# Patient Record
Sex: FEMALE | Race: ASIAN | NOT HISPANIC OR LATINO | Employment: PART TIME | URBAN - METROPOLITAN AREA
[De-identification: names, ages, dates, MRNs, and addresses within clinical notes are randomized per-mention and may not be internally consistent; named-entity substitution may affect disease eponyms.]

---

## 2017-01-02 ENCOUNTER — HOME CARE/HOSPICE - HEALTHEAST (OUTPATIENT)
Dept: HOME HEALTH SERVICES | Facility: HOME HEALTH | Age: 27
End: 2017-01-02

## 2017-01-03 ENCOUNTER — HOME CARE/HOSPICE - HEALTHEAST (OUTPATIENT)
Dept: HOME HEALTH SERVICES | Facility: HOME HEALTH | Age: 27
End: 2017-01-03

## 2017-01-04 ENCOUNTER — HOME CARE/HOSPICE - HEALTHEAST (OUTPATIENT)
Dept: HOME HEALTH SERVICES | Facility: HOME HEALTH | Age: 27
End: 2017-01-04

## 2017-05-24 ENCOUNTER — COMMUNICATION - HEALTHEAST (OUTPATIENT)
Dept: HEALTH INFORMATION MANAGEMENT | Facility: CLINIC | Age: 27
End: 2017-05-24

## 2017-05-24 ENCOUNTER — COMMUNICATION - HEALTHEAST (OUTPATIENT)
Dept: TELEHEALTH | Facility: CLINIC | Age: 27
End: 2017-05-24

## 2018-02-20 ENCOUNTER — COMMUNICATION - HEALTHEAST (OUTPATIENT)
Dept: FAMILY MEDICINE | Facility: CLINIC | Age: 28
End: 2018-02-20

## 2018-11-01 ENCOUNTER — RECORDS - HEALTHEAST (OUTPATIENT)
Dept: LAB | Facility: CLINIC | Age: 28
End: 2018-11-01

## 2018-11-02 LAB
HBV SURFACE AB SERPL IA-ACNC: POSITIVE M[IU]/ML
MEV IGG SER IA-ACNC: NEGATIVE
MUV IGG SER QL IA: POSITIVE
RUBV IGG SERPL QL IA: POSITIVE

## 2018-11-05 LAB
GAMMA INTERFERON BACKGROUND BLD IA-ACNC: 0.1 IU/ML
M TB IFN-G BLD-IMP: NEGATIVE
MITOGEN IGNF BCKGRD COR BLD-ACNC: -0.04 IU/ML
MITOGEN IGNF BCKGRD COR BLD-ACNC: -0.06 IU/ML
QTF INTERPRETATION: NORMAL
QTF MITOGEN - NIL: >10 IU/ML

## 2019-04-09 ENCOUNTER — RECORDS - HEALTHEAST (OUTPATIENT)
Dept: LAB | Facility: CLINIC | Age: 29
End: 2019-04-09

## 2019-04-10 LAB
MEV IGG SER IA-ACNC: NEGATIVE
MUV IGG SER QL IA: NEGATIVE
RUBV IGG SERPL QL IA: POSITIVE

## 2019-07-04 ENCOUNTER — HOSPITAL ENCOUNTER (OUTPATIENT)
Dept: MEDSURG UNIT | Facility: CLINIC | Age: 29
Discharge: HOME | End: 2019-07-04
Attending: OBSTETRICS & GYNECOLOGY | Admitting: OBSTETRICS & GYNECOLOGY

## 2019-08-18 ENCOUNTER — HOME CARE/HOSPICE - HEALTHEAST (OUTPATIENT)
Dept: HOME HEALTH SERVICES | Facility: HOME HEALTH | Age: 29
End: 2019-08-18

## 2021-06-27 ENCOUNTER — HEALTH MAINTENANCE LETTER (OUTPATIENT)
Age: 31
End: 2021-06-27

## 2021-10-17 ENCOUNTER — HEALTH MAINTENANCE LETTER (OUTPATIENT)
Age: 31
End: 2021-10-17

## 2021-10-19 ENCOUNTER — OFFICE VISIT (OUTPATIENT)
Dept: FAMILY MEDICINE | Facility: CLINIC | Age: 31
End: 2021-10-19
Payer: COMMERCIAL

## 2021-10-19 VITALS
TEMPERATURE: 98.2 F | WEIGHT: 161.9 LBS | RESPIRATION RATE: 16 BRPM | HEIGHT: 62 IN | DIASTOLIC BLOOD PRESSURE: 66 MMHG | BODY MASS INDEX: 29.79 KG/M2 | HEART RATE: 67 BPM | OXYGEN SATURATION: 98 % | SYSTOLIC BLOOD PRESSURE: 118 MMHG

## 2021-10-19 DIAGNOSIS — R07.0 THROAT PAIN: Primary | ICD-10-CM

## 2021-10-19 LAB
DEPRECATED S PYO AG THROAT QL EIA: NEGATIVE
GROUP A STREP BY PCR: NOT DETECTED
SARS-COV-2 RNA RESP QL NAA+PROBE: NEGATIVE

## 2021-10-19 PROCEDURE — U0005 INFEC AGEN DETEC AMPLI PROBE: HCPCS | Performed by: PHYSICIAN ASSISTANT

## 2021-10-19 PROCEDURE — 99203 OFFICE O/P NEW LOW 30 MIN: CPT | Performed by: PHYSICIAN ASSISTANT

## 2021-10-19 PROCEDURE — U0003 INFECTIOUS AGENT DETECTION BY NUCLEIC ACID (DNA OR RNA); SEVERE ACUTE RESPIRATORY SYNDROME CORONAVIRUS 2 (SARS-COV-2) (CORONAVIRUS DISEASE [COVID-19]), AMPLIFIED PROBE TECHNIQUE, MAKING USE OF HIGH THROUGHPUT TECHNOLOGIES AS DESCRIBED BY CMS-2020-01-R: HCPCS | Performed by: PHYSICIAN ASSISTANT

## 2021-10-19 PROCEDURE — 87651 STREP A DNA AMP PROBE: CPT | Performed by: PHYSICIAN ASSISTANT

## 2021-10-19 RX ORDER — ACETAMINOPHEN AND CODEINE PHOSPHATE 120; 12 MG/5ML; MG/5ML
SOLUTION ORAL
Status: ON HOLD | COMMUNITY
Start: 2021-09-19 | End: 2022-08-30

## 2021-10-19 ASSESSMENT — MIFFLIN-ST. JEOR: SCORE: 1407.62

## 2021-10-19 NOTE — PROGRESS NOTES
"  Assessment & Plan:      Problem List Items Addressed This Visit     None      Visit Diagnoses     Throat pain    -  Primary    Relevant Orders    Streptococcus A Rapid Screen w/Reflex to PCR - Clinic Collect (Completed)    Group A Streptococcus PCR Throat Swab    Symptomatic COVID-19 Virus (Coronavirus) by PCR        Medical Decision Making  Patient presents with acute onset throat pain.  Rapid strep is negative at this time.  Suspect likely viral etiology.  There is in process COVID-19.  Discussed self-isolation and prevention of spreading illness.  Continue with honey, salt water gargles, and over-the-counter analgesics as needed.  Discussed signs of worsening symptoms and when to follow-up with PCP if no symptom improvement.     Subjective:      Shavonne Ramos is a 31 year old female here for evaluation of sore throat.  Onset of symptoms was 3 to 4 days ago.  Associated symptoms include swollen lymph nodes along the anterior cervical region.  Patient has previously been vaccinated for COVID-19.  She denies rhinorrhea, cough, and shortness of breath.     The following portions of the patient's history were reviewed and updated as appropriate: allergies, current medications, and problem list.     Review of Systems  Pertinent items are noted in HPI.    Allergies  No Known Allergies    No family history on file.    Social History     Tobacco Use     Smoking status: Never Smoker     Smokeless tobacco: Never Used   Substance Use Topics     Alcohol use: No        Objective:      /66   Pulse 67   Temp 98.2  F (36.8  C)   Resp 16   Ht 1.575 m (5' 2\")   Wt 73.4 kg (161 lb 14.4 oz)   SpO2 98%   BMI 29.61 kg/m    General appearance - alert, well appearing, and in no distress and non-toxic  Ears - bilateral TM's and external ear canals normal  Nose - normal and patent, no erythema, discharge or polyps  Mouth - Posterior oropharynx mildly erythematous, no tonsillar swelling or exudate, mucous membranes moist, lips " and tongue normal  Neck - Mild to moderate bilateral anterior cervical lymphadenopathy  Chest - clear to auscultation, no wheezes, rales or rhonchi, symmetric air entry  Heart - normal rate, regular rhythm, normal S1, S2, no murmurs, rubs, clicks or gallops     Lab & Imaging Results    Results for orders placed or performed in visit on 10/19/21 (from the past 24 hour(s))   Streptococcus A Rapid Screen w/Reflex to PCR - Clinic Collect    Specimen: Throat; Swab   Result Value Ref Range    Group A Strep antigen Negative Negative       I personally reviewed these results and discussed findings with the patient.    The use of Dragon/2nd Watch dictation services was used to construct the content of this note; any grammatical errors are non-intentional. Please contact the author directly if you are in need of any clarification.

## 2021-10-19 NOTE — PATIENT INSTRUCTIONS
"Rapid Strep test was negative.     If overnight test returns positive, we will call tomorrow and call in antibiotic prescription.    No notification means that overnight test returned negative.    Symptoms are likely due to viral infection that will resolve on its own in 1-2 weeks.    May continue with symptomatic treatments including:  - Salt water gargles  - Warm beverages such as a non-caffeinated tea with honey  - Throat drops  - Ibuprofen or acetaminophen for pain or fever relief    If fevers not coming down with acetaminophen or ibuprofen, shortness of breath, not tolerating oral liquid intake, drooling, or stiff neck, return for re-evaluation immediately. Otherwise, if no improvement in the next week, follow up with your primary care provider.    Discharge Instructions for COVID-19 Patients  You have--or may have--COVID-19. Please follow the instructions listed below.   If you have a weakened immune system, discuss with your doctor any other actions you need to take.  How can I protect others?  If you have symptoms (fever, cough, body aches or trouble breathing):    Stay home and away from others (self-isolate) until:  ? Your other symptoms have resolved (gotten better). And   ? You've had no fever--and no medicine that reduces fever--for 1 full day (24 hours). And   ? At least 10 days have passed since your symptoms started. (You may need to wait 20 days. Follow the advice of your care team.)  If you don't show symptoms, but testing showed that you have COVID-19:    Stay home and away from others (self-isolate) until at least 10 days have passed since the date of your first positive COVID-19 test.  During this time    Stay in your own room, even for meals. Use your own bathroom if you can.    Stay away from others in your home. No hugging, kissing or shaking hands. No visitors.    Don't go to work, school or anywhere else.    Clean \"high touch\" surfaces often (doorknobs, counters, handles). Use household " cleaning spray or wipes.    You'll find a full list of  on the EPA website: www.epa.gov/pesticide-registration/list-n-disinfectants-use-against-sars-cov-2.    Cover your mouth and nose with a mask or other face covering to avoid spreading germs.    Wash your hands and face often. Use soap and water.    Caregivers in these groups are at risk for severe illness due to COVID-19:  ? People 65 years and older  ? People who live in a nursing home or long-term care facility  ? People with chronic disease (lung, heart, cancer, diabetes, kidney, liver, immunologic)  ? People who have a weakened immune system, including those who:    Are in cancer treatment    Take medicine that weakens the immune system, such as corticosteroids    Had a bone marrow or organ transplant    Have an immune deficiency    Have poorly controlled HIV or AIDS    Are obese (body mass index of 40 or higher)    Smoke regularly    Caregivers should wear gloves while washing dishes, handling laundry and cleaning bedrooms and bathrooms.    Use caution when washing and drying laundry: Don't shake dirty laundry and use the warmest water setting that you can.    For more tips on managing your health at home, go to www.cdc.gov/coronavirus/2019-ncov/downloads/10Things.pdf.  How can I take care of myself at home?  1. Get lots of rest. Drink extra fluids (unless a doctor has told you not to).  2. Take Tylenol (acetaminophen) for fever or pain. If you have liver or kidney problems, ask your family doctor if it's okay to take Tylenol.   Adults can take either:   ? 650 mg (two 325 mg pills) every 4 to 6 hours, or   ? 1,000 mg (two 500 mg pills) every 8 hours as needed.  ? Note: Don't take more than 3,000 mg in one day. Acetaminophen is found in many medicines (both prescribed and over-the-counter medicines). Read all labels to be sure you don't take too much.   For children, check the Tylenol bottle for the right dose. The dose is based on the child's age or  weight.  3. If you have other health problems (like cancer, heart failure, an organ transplant or severe kidney disease): Call your specialty clinic if you don't feel better in the next 2 days.  4. Know when to call 911. Emergency warning signs include:  ? Trouble breathing or shortness of breath  ? Pain or pressure in the chest that doesn't go away  ? Feeling confused like you haven't felt before, or not being able to wake up  ? Bluish-colored lips or face  5. Your doctor may have prescribed a blood thinner medicine. Follow their instructions.  Where can I get more information?    [facility] - About COVID-19:   [email or web address]    CDC - What to Do If You're Sick: www.cdc.gov/coronavirus/2019-ncov/about/steps-when-sick.html    CDC - Ending Home Isolation: www.cdc.gov/coronavirus/2019-ncov/hcp/disposition-in-home-patients.html    CDC - Caring for Someone: www.cdc.gov/coronavirus/2019-ncov/if-you-are-sick/care-for-someone.html    [facility] - Interim Guidance for Hospital Discharge to Home: www.health.[facility].[place]/diseases/coronavirus/hcp/hospdischarge.pdf    Below are the COVID-19 hotlines at the [place] Department of Health ([facility]). Interpreters are available.  ? For health questions: Call 758-773-5846 or 1-556.839.1228 (7 a.m. to 7 p.m.)  ? For questions about schools and childcare: Call 543-311-6810 or 1-930.946.4003 (7 a.m. to 7 p.m.)    For informational purposes only. Not to replace the advice of your health care provider. Clinically reviewed by Dr. Antonio Zuñiga.   Copyright   2020 [employer]. All rights reserved. Tyba [id number] - REV 01/05/21.

## 2022-03-02 LAB — HEPATITIS B SURFACE ANTIGEN (EXTERNAL): NEGATIVE

## 2022-03-03 ENCOUNTER — TRANSFERRED RECORDS (OUTPATIENT)
Dept: HEALTH INFORMATION MANAGEMENT | Facility: CLINIC | Age: 32
End: 2022-03-03

## 2022-07-24 ENCOUNTER — HEALTH MAINTENANCE LETTER (OUTPATIENT)
Age: 32
End: 2022-07-24

## 2022-08-17 ENCOUNTER — LAB REQUISITION (OUTPATIENT)
Dept: LAB | Facility: CLINIC | Age: 32
End: 2022-08-17
Payer: COMMERCIAL

## 2022-08-17 DIAGNOSIS — Z36.85 ENCOUNTER FOR ANTENATAL SCREENING FOR STREPTOCOCCUS B: ICD-10-CM

## 2022-08-17 PROCEDURE — 87653 STREP B DNA AMP PROBE: CPT | Mod: ORL | Performed by: NURSE PRACTITIONER

## 2022-08-18 LAB — GP B STREP DNA SPEC QL NAA+PROBE: NEGATIVE

## 2022-08-30 ENCOUNTER — HOSPITAL ENCOUNTER (OUTPATIENT)
Facility: HOSPITAL | Age: 32
Discharge: HOME | End: 2022-08-30
Attending: OBSTETRICS & GYNECOLOGY | Admitting: OBSTETRICS & GYNECOLOGY
Payer: COMMERCIAL

## 2022-08-30 VITALS
SYSTOLIC BLOOD PRESSURE: 112 MMHG | BODY MASS INDEX: 29.44 KG/M2 | RESPIRATION RATE: 18 BRPM | HEART RATE: 90 BPM | TEMPERATURE: 98.2 F | WEIGHT: 160 LBS | DIASTOLIC BLOOD PRESSURE: 77 MMHG | HEIGHT: 62 IN

## 2022-08-30 PROBLEM — Z36.89 ENCOUNTER FOR TRIAGE IN PREGNANT PATIENT: Status: ACTIVE | Noted: 2022-08-30

## 2022-08-30 PROCEDURE — G0463 HOSPITAL OUTPT CLINIC VISIT: HCPCS

## 2022-08-30 ASSESSMENT — ACTIVITIES OF DAILY LIVING (ADL): ADLS_ACUITY_SCORE: 35

## 2022-08-30 NOTE — PROGRESS NOTES
Shavonne arrived ambulatory from home with complaints of contractions that started this morning at 1000. Pt states positive fetal movement and denies any bleeding or leaking of fluids. Abdomen soft, non-tender. Pt states that since arriving to the hospital, her contractions have spaced out. Monitors placed, call light within reach.

## 2022-08-30 NOTE — PROGRESS NOTES
Dr Townsend called and notified of unchanged SVE. Pt still feels occasional contractions but they have not increased in intensity of frequency. Pt has follow up appointment with Dr Townsend tomorrow, 8/31/22. Pt given discharge paperwork and instructed to follow up with PCP if any bleeding or leaking of fluids, decreased fetal movement, or painful, regular contractions. Pt verbalizes understanding.

## 2022-08-30 NOTE — PROGRESS NOTES
Dr Sanchez, covering OB, called and notified of pt arrival along with FHT tracing and SVE. .Order to observe patient for two hours and see if she makes cervical change. Dr Townsend called unit and aware of plan as well.

## 2022-08-30 NOTE — DISCHARGE INSTRUCTIONS
Discharge Instruction for Undelivered Patients      Activity:  Bedrest     Call your provider if you notice:  Swelling in your face or increased swelling in your hands or legs.  Headaches that are not relieved by Tylenol (acetaminophen).  Changes in your vision (blurring: seeing spots or stars.)  Nausea (sick to your stomach) and vomiting (throwing up).   Weight gain of 5 pounds or more per week.  Heartburn that doesn't go away.  Signs of bladder infection: pain when you urinate (use the toilet), need to go more often and more urgently.  The bag of irwin (rupture of membranes) breaks, or you notice leaking in your underwear.  Bright red blood in your underwear.  Abdominal (lower belly) or stomach pain.  For first baby: Contractions (tightening) less than 5 minutes apart for one hour or more.  Second (plus) baby: Contractions (tightening) less than 10 minutes apart and getting stronger.  *If less than 34 weeks: Contractions (tightening) more than 6 times in one hour.      Follow-up:  Scheduled appointment 8/31/22        Kick Counts  It s normal to worry about your baby s health. One way you can know your baby s doing well is to record the baby s movements once a day. This is called a kick count.   Remember to take your kick count records to all your appointments with your healthcare provider.  How to count kicks    Time how long it takes you to feel 10 kicks, flutters, swishes, or rolls. Ideally, you want to feel at least 10 movements in 2 hours. You will likely feel 10 movements in less time than that.  Starting at 28 weeks, count your baby's movements daily. Follow your healthcare provider's instructions for kick counting. Here are tips for counting kicks:  Choose a time when the baby is active, such as after a meal.   Sit comfortably or lie on your side.   The first time the baby moves, write down the time.   Count each movement until the baby has moved  10 times. This can take from 20 minutes to 2 hours.   If  you haven't felt 10 kicks by the end of the second hour, wait a few hours. Then try again.  Try to do it at the same time each day.  When to call your healthcare provider  Call your healthcare provider  right away if:  You do a couple sets of kick counts during the day and your baby moves fewer than 10 times in 2 hours.  Your baby moves much less often than on the days before.  You haven't felt your baby move all day.  [employer] last reviewed this educational content on 8/1/2020 2000-2021 [employer]. All rights reserved. This information is not intended as a substitute for professional medical care. Always follow your healthcare professional's instructions.

## 2022-08-31 ENCOUNTER — HOSPITAL ENCOUNTER (OUTPATIENT)
Facility: HOSPITAL | Age: 32
Discharge: HOME | End: 2022-08-31
Attending: OBSTETRICS & GYNECOLOGY | Admitting: OBSTETRICS & GYNECOLOGY
Payer: COMMERCIAL

## 2022-08-31 VITALS
TEMPERATURE: 97.9 F | WEIGHT: 176 LBS | DIASTOLIC BLOOD PRESSURE: 79 MMHG | HEIGHT: 62 IN | RESPIRATION RATE: 16 BRPM | BODY MASS INDEX: 32.39 KG/M2 | SYSTOLIC BLOOD PRESSURE: 114 MMHG

## 2022-08-31 PROCEDURE — G0463 HOSPITAL OUTPT CLINIC VISIT: HCPCS

## 2022-08-31 ASSESSMENT — ACTIVITIES OF DAILY LIVING (ADL): ADLS_ACUITY_SCORE: 35

## 2022-09-01 NOTE — DISCHARGE INSTRUCTIONS
Discharge Instruction for Undelivered Patients      You were seen for: Labor Assessment  We Consulted: Dr. Townsend  You had (Test or Medicine): fetal monitoring     Diet:   Drink 8 to 12 glasses of liquids (milk, juice, water) every day.     Activity:  Call your doctor or nurse midwife if your baby is moving less than usual.     Call your provider if you notice:  Swelling in your face or increased swelling in your hands or legs.  Headaches that are not relieved by Tylenol (acetaminophen).  Changes in your vision (blurring: seeing spots or stars.)  Nausea (sick to your stomach) and vomiting (throwing up).   Weight gain of 5 pounds or more per week.  Heartburn that doesn't go away.  Signs of bladder infection: pain when you urinate (use the toilet), need to go more often and more urgently.  The bag of irwin (rupture of membranes) breaks, or you notice leaking in your underwear.  Bright red blood in your underwear.  Abdominal (lower belly) or stomach pain.  For first baby: Contractions (tightening) less than 5 minutes apart for one hour or more.  Second (plus) baby: Contractions (tightening) less than 10 minutes apart and getting stronger.  *If less than 34 weeks: Contractions (tightening) more than 6 times in one hour.  Increase or change in vaginal discharge (note the color and amount)    Follow-up:  As scheduled in the clinic

## 2022-09-01 NOTE — PROGRESS NOTES
Dr. Townsend notified of patient's SVE, unchanged. Orders to discharge home and call hospital if any signs of labor start.    Reviewed discharge instructions, danger signs, and follow up with patient. All questions and concerns answered at this time. Patient verbalized understanding. Patient discharged home.

## 2022-09-08 ENCOUNTER — HOSPITAL ENCOUNTER (INPATIENT)
Facility: HOSPITAL | Age: 32
LOS: 2 days | Discharge: HOME | End: 2022-09-10
Attending: OBSTETRICS & GYNECOLOGY | Admitting: OBSTETRICS & GYNECOLOGY
Payer: COMMERCIAL

## 2022-09-08 LAB
ABO/RH(D): NORMAL
ANTIBODY SCREEN: NEGATIVE
SPECIMEN EXPIRATION DATE: NORMAL

## 2022-09-08 PROCEDURE — 36415 COLL VENOUS BLD VENIPUNCTURE: CPT | Performed by: OBSTETRICS & GYNECOLOGY

## 2022-09-08 PROCEDURE — U0005 INFEC AGEN DETEC AMPLI PROBE: HCPCS | Performed by: OBSTETRICS & GYNECOLOGY

## 2022-09-08 PROCEDURE — 250N000011 HC RX IP 250 OP 636: Performed by: OBSTETRICS & GYNECOLOGY

## 2022-09-08 PROCEDURE — 85027 COMPLETE CBC AUTOMATED: CPT | Performed by: OBSTETRICS & GYNECOLOGY

## 2022-09-08 PROCEDURE — 86850 RBC ANTIBODY SCREEN: CPT | Performed by: OBSTETRICS & GYNECOLOGY

## 2022-09-08 PROCEDURE — 120N000001 HC R&B MED SURG/OB

## 2022-09-08 PROCEDURE — 86780 TREPONEMA PALLIDUM: CPT | Performed by: OBSTETRICS & GYNECOLOGY

## 2022-09-08 RX ORDER — SODIUM CHLORIDE, SODIUM LACTATE, POTASSIUM CHLORIDE, CALCIUM CHLORIDE 600; 310; 30; 20 MG/100ML; MG/100ML; MG/100ML; MG/100ML
INJECTION, SOLUTION INTRAVENOUS CONTINUOUS
Status: DISCONTINUED | OUTPATIENT
Start: 2022-09-08 | End: 2022-09-09 | Stop reason: HOSPADM

## 2022-09-08 RX ORDER — METOCLOPRAMIDE 10 MG/1
10 TABLET ORAL EVERY 6 HOURS PRN
Status: DISCONTINUED | OUTPATIENT
Start: 2022-09-08 | End: 2022-09-09 | Stop reason: HOSPADM

## 2022-09-08 RX ORDER — NALOXONE HYDROCHLORIDE 0.4 MG/ML
0.4 INJECTION, SOLUTION INTRAMUSCULAR; INTRAVENOUS; SUBCUTANEOUS
Status: DISCONTINUED | OUTPATIENT
Start: 2022-09-08 | End: 2022-09-09 | Stop reason: HOSPADM

## 2022-09-08 RX ORDER — PROCHLORPERAZINE 25 MG
25 SUPPOSITORY, RECTAL RECTAL EVERY 12 HOURS PRN
Status: DISCONTINUED | OUTPATIENT
Start: 2022-09-08 | End: 2022-09-09 | Stop reason: HOSPADM

## 2022-09-08 RX ORDER — NALOXONE HYDROCHLORIDE 0.4 MG/ML
0.2 INJECTION, SOLUTION INTRAMUSCULAR; INTRAVENOUS; SUBCUTANEOUS
Status: DISCONTINUED | OUTPATIENT
Start: 2022-09-08 | End: 2022-09-09 | Stop reason: HOSPADM

## 2022-09-08 RX ORDER — PROCHLORPERAZINE MALEATE 10 MG
10 TABLET ORAL EVERY 6 HOURS PRN
Status: DISCONTINUED | OUTPATIENT
Start: 2022-09-08 | End: 2022-09-09 | Stop reason: HOSPADM

## 2022-09-08 RX ORDER — OXYTOCIN 10 [USP'U]/ML
10 INJECTION, SOLUTION INTRAMUSCULAR; INTRAVENOUS
Status: DISCONTINUED | OUTPATIENT
Start: 2022-09-08 | End: 2022-09-10 | Stop reason: HOSPADM

## 2022-09-08 RX ORDER — LIDOCAINE 40 MG/G
CREAM TOPICAL
Status: DISCONTINUED | OUTPATIENT
Start: 2022-09-08 | End: 2022-09-09 | Stop reason: HOSPADM

## 2022-09-08 RX ORDER — ACETAMINOPHEN 325 MG/1
650 TABLET ORAL EVERY 4 HOURS PRN
Status: DISCONTINUED | OUTPATIENT
Start: 2022-09-08 | End: 2022-09-09 | Stop reason: HOSPADM

## 2022-09-08 RX ORDER — OXYTOCIN/0.9 % SODIUM CHLORIDE 30/500 ML
340 PLASTIC BAG, INJECTION (ML) INTRAVENOUS CONTINUOUS PRN
Status: DISCONTINUED | OUTPATIENT
Start: 2022-09-08 | End: 2022-09-09 | Stop reason: HOSPADM

## 2022-09-08 RX ORDER — OXYTOCIN 10 [USP'U]/ML
10 INJECTION, SOLUTION INTRAMUSCULAR; INTRAVENOUS
Status: DISCONTINUED | OUTPATIENT
Start: 2022-09-08 | End: 2022-09-09 | Stop reason: HOSPADM

## 2022-09-08 RX ORDER — CITRIC ACID/SODIUM CITRATE 334-500MG
30 SOLUTION, ORAL ORAL
Status: DISCONTINUED | OUTPATIENT
Start: 2022-09-08 | End: 2022-09-09 | Stop reason: HOSPADM

## 2022-09-08 RX ORDER — ONDANSETRON 4 MG/1
4 TABLET, ORALLY DISINTEGRATING ORAL EVERY 6 HOURS PRN
Status: DISCONTINUED | OUTPATIENT
Start: 2022-09-08 | End: 2022-09-09 | Stop reason: HOSPADM

## 2022-09-08 RX ORDER — METHYLERGONOVINE MALEATE 0.2 MG/ML
200 INJECTION INTRAVENOUS
Status: DISCONTINUED | OUTPATIENT
Start: 2022-09-08 | End: 2022-09-09 | Stop reason: HOSPADM

## 2022-09-08 RX ORDER — ONDANSETRON 2 MG/ML
4 INJECTION INTRAMUSCULAR; INTRAVENOUS EVERY 6 HOURS PRN
Status: DISCONTINUED | OUTPATIENT
Start: 2022-09-08 | End: 2022-09-09 | Stop reason: HOSPADM

## 2022-09-08 RX ORDER — OXYTOCIN/0.9 % SODIUM CHLORIDE 30/500 ML
100-340 PLASTIC BAG, INJECTION (ML) INTRAVENOUS CONTINUOUS PRN
Status: DISCONTINUED | OUTPATIENT
Start: 2022-09-08 | End: 2022-09-10 | Stop reason: HOSPADM

## 2022-09-08 RX ORDER — MISOPROSTOL 200 UG/1
800 TABLET ORAL
Status: DISCONTINUED | OUTPATIENT
Start: 2022-09-08 | End: 2022-09-09 | Stop reason: HOSPADM

## 2022-09-08 RX ORDER — IBUPROFEN 800 MG/1
800 TABLET, FILM COATED ORAL
Status: DISCONTINUED | OUTPATIENT
Start: 2022-09-08 | End: 2022-09-10 | Stop reason: HOSPADM

## 2022-09-08 RX ORDER — FENTANYL CITRATE 50 UG/ML
100 INJECTION, SOLUTION INTRAMUSCULAR; INTRAVENOUS
Status: DISCONTINUED | OUTPATIENT
Start: 2022-09-08 | End: 2022-09-09 | Stop reason: HOSPADM

## 2022-09-08 RX ORDER — MISOPROSTOL 200 UG/1
400 TABLET ORAL
Status: DISCONTINUED | OUTPATIENT
Start: 2022-09-08 | End: 2022-09-09 | Stop reason: HOSPADM

## 2022-09-08 RX ORDER — METOCLOPRAMIDE HYDROCHLORIDE 5 MG/ML
10 INJECTION INTRAMUSCULAR; INTRAVENOUS EVERY 6 HOURS PRN
Status: DISCONTINUED | OUTPATIENT
Start: 2022-09-08 | End: 2022-09-09 | Stop reason: HOSPADM

## 2022-09-08 RX ORDER — KETOROLAC TROMETHAMINE 30 MG/ML
30 INJECTION, SOLUTION INTRAMUSCULAR; INTRAVENOUS
Status: DISCONTINUED | OUTPATIENT
Start: 2022-09-08 | End: 2022-09-10 | Stop reason: HOSPADM

## 2022-09-08 RX ORDER — CARBOPROST TROMETHAMINE 250 UG/ML
250 INJECTION, SOLUTION INTRAMUSCULAR
Status: DISCONTINUED | OUTPATIENT
Start: 2022-09-08 | End: 2022-09-09 | Stop reason: HOSPADM

## 2022-09-08 RX ADMIN — FENTANYL CITRATE 100 MCG: 50 INJECTION, SOLUTION INTRAMUSCULAR; INTRAVENOUS at 23:35

## 2022-09-08 ASSESSMENT — ACTIVITIES OF DAILY LIVING (ADL): ADLS_ACUITY_SCORE: 35

## 2022-09-09 LAB
ERYTHROCYTE [DISTWIDTH] IN BLOOD BY AUTOMATED COUNT: 15.1 % (ref 10–15)
HCT VFR BLD AUTO: 33.6 % (ref 35–47)
HGB BLD-MCNC: 11 G/DL (ref 11.7–15.7)
HOLD SPECIMEN: NORMAL
MCH RBC QN AUTO: 24.2 PG (ref 26.5–33)
MCHC RBC AUTO-ENTMCNC: 32.7 G/DL (ref 31.5–36.5)
MCV RBC AUTO: 74 FL (ref 78–100)
PLATELET # BLD AUTO: 220 10E3/UL (ref 150–450)
RBC # BLD AUTO: 4.54 10E6/UL (ref 3.8–5.2)
SARS-COV-2 RNA RESP QL NAA+PROBE: NEGATIVE
T PALLIDUM AB SER QL: NONREACTIVE
WBC # BLD AUTO: 9.7 10E3/UL (ref 4–11)

## 2022-09-09 PROCEDURE — 722N000001 HC LABOR CARE VAGINAL DELIVERY SINGLE

## 2022-09-09 PROCEDURE — 250N000009 HC RX 250: Performed by: OBSTETRICS & GYNECOLOGY

## 2022-09-09 PROCEDURE — 250N000011 HC RX IP 250 OP 636: Performed by: OBSTETRICS & GYNECOLOGY

## 2022-09-09 PROCEDURE — 250N000013 HC RX MED GY IP 250 OP 250 PS 637: Performed by: OBSTETRICS & GYNECOLOGY

## 2022-09-09 PROCEDURE — 120N000001 HC R&B MED SURG/OB

## 2022-09-09 PROCEDURE — 0KQM0ZZ REPAIR PERINEUM MUSCLE, OPEN APPROACH: ICD-10-PCS | Performed by: OBSTETRICS & GYNECOLOGY

## 2022-09-09 RX ORDER — ACETAMINOPHEN 325 MG/1
650 TABLET ORAL EVERY 4 HOURS PRN
Status: DISCONTINUED | OUTPATIENT
Start: 2022-09-09 | End: 2022-09-10 | Stop reason: HOSPADM

## 2022-09-09 RX ORDER — MODIFIED LANOLIN
OINTMENT (GRAM) TOPICAL
Status: DISCONTINUED | OUTPATIENT
Start: 2022-09-09 | End: 2022-09-10 | Stop reason: HOSPADM

## 2022-09-09 RX ORDER — CARBOPROST TROMETHAMINE 250 UG/ML
250 INJECTION, SOLUTION INTRAMUSCULAR
Status: DISCONTINUED | OUTPATIENT
Start: 2022-09-09 | End: 2022-09-10 | Stop reason: HOSPADM

## 2022-09-09 RX ORDER — METHYLERGONOVINE MALEATE 0.2 MG/ML
200 INJECTION INTRAVENOUS
Status: DISCONTINUED | OUTPATIENT
Start: 2022-09-09 | End: 2022-09-10 | Stop reason: HOSPADM

## 2022-09-09 RX ORDER — BISACODYL 10 MG
10 SUPPOSITORY, RECTAL RECTAL DAILY PRN
Status: DISCONTINUED | OUTPATIENT
Start: 2022-09-09 | End: 2022-09-10 | Stop reason: HOSPADM

## 2022-09-09 RX ORDER — HYDROCORTISONE 25 MG/G
CREAM TOPICAL 3 TIMES DAILY PRN
Status: DISCONTINUED | OUTPATIENT
Start: 2022-09-09 | End: 2022-09-10 | Stop reason: HOSPADM

## 2022-09-09 RX ORDER — OXYTOCIN 10 [USP'U]/ML
10 INJECTION, SOLUTION INTRAMUSCULAR; INTRAVENOUS
Status: DISCONTINUED | OUTPATIENT
Start: 2022-09-09 | End: 2022-09-10 | Stop reason: HOSPADM

## 2022-09-09 RX ORDER — IBUPROFEN 800 MG/1
800 TABLET, FILM COATED ORAL EVERY 6 HOURS PRN
Status: DISCONTINUED | OUTPATIENT
Start: 2022-09-09 | End: 2022-09-10 | Stop reason: HOSPADM

## 2022-09-09 RX ORDER — OXYTOCIN/0.9 % SODIUM CHLORIDE 30/500 ML
340 PLASTIC BAG, INJECTION (ML) INTRAVENOUS CONTINUOUS PRN
Status: DISCONTINUED | OUTPATIENT
Start: 2022-09-09 | End: 2022-09-10 | Stop reason: HOSPADM

## 2022-09-09 RX ORDER — MISOPROSTOL 200 UG/1
400 TABLET ORAL
Status: DISCONTINUED | OUTPATIENT
Start: 2022-09-09 | End: 2022-09-10 | Stop reason: HOSPADM

## 2022-09-09 RX ORDER — DOCUSATE SODIUM 100 MG/1
100 CAPSULE, LIQUID FILLED ORAL DAILY
Status: DISCONTINUED | OUTPATIENT
Start: 2022-09-09 | End: 2022-09-10 | Stop reason: HOSPADM

## 2022-09-09 RX ORDER — MISOPROSTOL 200 UG/1
800 TABLET ORAL
Status: DISCONTINUED | OUTPATIENT
Start: 2022-09-09 | End: 2022-09-10 | Stop reason: HOSPADM

## 2022-09-09 RX ADMIN — ACETAMINOPHEN 650 MG: 325 TABLET, FILM COATED ORAL at 23:55

## 2022-09-09 RX ADMIN — METHYLERGONOVINE MALEATE 200 MCG: 0.2 INJECTION INTRAVENOUS at 00:15

## 2022-09-09 RX ADMIN — LIDOCAINE HYDROCHLORIDE 10 ML: 10 INJECTION, SOLUTION EPIDURAL; INFILTRATION; INTRACAUDAL; PERINEURAL at 00:14

## 2022-09-09 RX ADMIN — Medication 340 ML/HR: at 00:12

## 2022-09-09 RX ADMIN — DOCUSATE SODIUM 100 MG: 100 CAPSULE, LIQUID FILLED ORAL at 09:51

## 2022-09-09 ASSESSMENT — ACTIVITIES OF DAILY LIVING (ADL)
ADLS_ACUITY_SCORE: 20
FALL_HISTORY_WITHIN_LAST_SIX_MONTHS: NO
ADLS_ACUITY_SCORE: 20
HEARING_DIFFICULTY_OR_DEAF: NO
ADLS_ACUITY_SCORE: 20
DOING_ERRANDS_INDEPENDENTLY_DIFFICULTY: NO
ADLS_ACUITY_SCORE: 20
DIFFICULTY_COMMUNICATING: NO
TOILETING_ISSUES: NO
DIFFICULTY_EATING/SWALLOWING: NO
WALKING_OR_CLIMBING_STAIRS_DIFFICULTY: NO
DRESSING/BATHING_DIFFICULTY: NO
ADLS_ACUITY_SCORE: 20
ADLS_ACUITY_SCORE: 35
WEAR_GLASSES_OR_BLIND: YES
ADLS_ACUITY_SCORE: 20
CONCENTRATING,_REMEMBERING_OR_MAKING_DECISIONS_DIFFICULTY: NO
ADLS_ACUITY_SCORE: 20
ADLS_ACUITY_SCORE: 20
ADLS_ACUITY_SCORE: 35
CHANGE_IN_FUNCTIONAL_STATUS_SINCE_ONSET_OF_CURRENT_ILLNESS/INJURY: NO

## 2022-09-09 NOTE — PLAN OF CARE
Shavonne is recovering from her vaginal birth well.  She has her SO at her bedside and her son.  She had a visitor.  Shavonne's VSS, her pain is well controlled without medication. Shavonne is resting, she has been up to the bathroom, but has not tubbed yet.        Problem: Adjustment to Role Transition (Postpartum Vaginal Delivery)  Goal: Successful Maternal Role Transition  Outcome: Ongoing, Progressing  Intervention: Support Maternal Role Transition  Recent Flowsheet Documentation  Taken 9/9/2022 9490 by Amirah Lehman, RN  Supportive Measures: active listening utilized  Parent/Child Attachment Promotion:   cue recognition promoted   positive reinforcement provided     Problem: Pain (Postpartum Vaginal Delivery)  Goal: Acceptable Pain Control  Outcome: Ongoing, Progressing

## 2022-09-09 NOTE — PROGRESS NOTES
"OB Post Partum Note    ASSESSMENT: PLAN:     PPD#0  spontaneous vaginal delivery  Doing well  Routine cares  Anticipate discharge to home in am    SUBJECTIVE:   no complaints, denies fever, chills.  Tolerating po, ambulating.  Baby doing well    OBJECTIVE:    /77 (BP Location: Left arm)   Pulse 76   Temp 97.9  F (36.6  C) (Oral)   Resp 16   Ht 1.575 m (5' 2\")   Wt 78.9 kg (174 lb)   SpO2 98%   Breastfeeding Unknown   BMI 31.83 kg/m        abd- fundus firm  Ext- no tenderness,   cv- regular rate  Lungs- clear    Sarah Genao MD  [facility] OBN  [phone]    "

## 2022-09-09 NOTE — PLAN OF CARE
Problem: Bleeding (Postpartum Vaginal Delivery)  Goal: Hemostasis  Outcome: Ongoing, Progressing     Problem: Pain (Postpartum Vaginal Delivery)  Goal: Acceptable Pain Control  Outcome: Ongoing, Progressing

## 2022-09-09 NOTE — H&P
[facility]    History and Physical  Obstetrics and Gynecology     Date of Admission:  2022    Assessment & Plan   Shavonne Ramos is a 32 year old  at 38w3d who presents with labor.    ASSESSMENT:   IUP @ 38w3d in early labor.  FHT  Category  I    PLAN:   Admit - see IP orders  Anticipate     Mey Cross MD, FACOG, [facility]  2022 11:22 PM          History of Present Illness   Shavonne Ramos is a 32 year old female  at 38w3d is [facility] in early [facility].    PRENATAL COURSE  Prenatal course was essentially uncomplicated      Prenatal labs notable for: Rh positive, GBS negative      Past Medical History    I have reviewed this patient's medical history and updated it with pertinent information if needed.   No past medical history on file.    Past Surgical History   I have reviewed this patient's surgical history and updated it with pertinent information if needed.  Past Surgical History:   Procedure Laterality Date     OTHER SURGICAL HISTORY      donated kidney       Prior to Admission Medications   Prior to Admission Medications   Prescriptions Last Dose Informant Patient Reported? Taking?   prenatal vitamin iron-folic acid 27mg-0.8mg (PRENATAL S) 27 mg iron- 800 mcg Tab tablet   Yes No   Sig: Take 1 tablet by mouth daily      Facility-Administered Medications: None     Allergies   No Known Allergies    Social History   I have reviewed this patient's social history and updated it with pertinent information if needed. Shavonne Ramos  reports that she has never smoked. She has never used smokeless tobacco. She reports that she does not drink alcohol and does not use drugs.    Family History   I have reviewed this patient's family history and updated it with pertinent information if needed.   No family history on file.      Physical Exam                      Vital Signs with Ranges       Abdomen: gravid, single vertex fetus, non-tender, EFW 7 lbs 10  Cervical  Exam: 8 cm    Fetal Heart Tones: normal baseline, moderate variablility, + accels, no decels and Category I  TOCO:   frequency q 5 minutes    Constitutional: healthy, alert  Respiratory: No increased work of breathing, good air exchange.  Cardiovascular: RRR

## 2022-09-09 NOTE — L&D DELIVERY NOTE
Delivery Summary    Shavonne Ramos MRN# [id number]   Age: 32 year old YOB: 1990     VAGINAL DELIVERY NOTE    PRE DELIVERY DIAGNOSIS  1) Intrauterine pregnancy at 38w4d   2) SROM      POST DELIVERY DIAGNOSIS  1) Intrauterine pregnancy at 38w4d   2) Delivery of a viable male.  3) Apgars: 9 and 9  4) weight: pending    Delivery Method/Type:       PROVIDER:   Lizy Cross MD     ANESTHESIA: IV Fentanyl    COMPLICATIONS: None    DESCRIPTION OF PROCEDURE  Shavonne Ramos is a 32 year old  with prenatal care with Dr. Townsend who was admitted at 38w4d for SROM. Patient progressed normally. Patient had a .  Delayed cord clamping performed.  No gross fetal defects were observed. Pitocin was administered. Placenta delivered intact with 3 vessel cord. The perineum was then evaluated and noted to have a shallow 2nd degree laceration that was repaired in the usual manner with 3-0 Vicryl Rapide. Delivery QBL (mL): 466     Lizy Cross MD                          Richard, Male-Shavonne [[id number]]    Labor Event Times    Labor onset date: 22 Onset time:  9:30 AM   Dilation complete date: 22 Complete time: 11:50 PM   Start pushing date/time: 2022 2354      Labor Events     labor?: No   steroids: None  Labor Type: Spontaneous  Predominate monitoring during 1st stage: continuous electronic fetal monitoring     Antibiotics received during labor?: No     Rupture date/time: 22 2250   Rupture type: Spontaneous rupture of membranes occuring during spontaneous labor or augmentation  Fluid color: Clear  Fluid odor: Normal     1:1 continuous labor support provided by?: provider, RN       Delivery/Placenta Date and Time    Delivery Date: 22 Delivery Time: 12:10 AM   Oxytocin given at the time of delivery: after delivery of baby  Delivering clinician: Lizy Cross MD   Other personnel present at delivery:  Provider Role   Nancy De La Fuente, RN Delivery Nurse    Edna Bledsoe, RN Delivery Nurse   Renee Sánchez RN Registered Nurse         Vaginal Counts     Initial count performed by 2 team members:  Two Team Members   Dr. Tay Bledsoe RN       [place] Suture Needles Sponges (RETIRED) Instruments   Initial counts 0 0 5    Added to count 1 1     Relief counts       Final counts 1 1 5          Placed during labor Accounted for at the end of labor   FSE NA NA   IUPC NA NA   Cervidil NA NA              Final count performed by 2 team members:  Two Team Members   Dr. Tay Bledsoe RN      Final count correct?: Yes     Apgars    Living status: Living   1 Minute 5 Minute 10 Minute 15 Minute 20 Minute   Skin color: 1  1       Heart rate: 2  2       Reflex irritability: 2  2       Muscle tone: 2  2       Respiratory effort: 2  2       Total: 9  9       Apgars assigned by: EDNA BLEDSOE RN     Cord    Cord Complications: None               Cord Blood Disposition: Lab    Gases Sent?: No    Delayed cord clamping?: Yes    Cord Clamping Delay (seconds):  seconds    Stem cell collection?: No       Labor Events and Shoulder Dystocia    Fetal Tracing Prior to Delivery: Category 1  Shoulder dystocia present?: Neg     Delivery (Maternal) (Provider to Complete) ([id number])    Episiotomy: None  Perineal lacerations: 2nd Repaired?: Yes   Repair suture: 3-0 Rapide  Genital tract inspection done: Pos     Blood Loss  Mother: Shavonne Ramos #[id number]   Start of Mother's Information    Delivery Blood Loss  22 0930 - 22 0026       Unable to report on blood loss         Start time is after end time. Verify documentation for labor onset date/time,  procedure start date/time, and birth date/time.        End of Mother's Information  Mother: Shavonne Ramos #[id number]          Delivery - Provider to Complete ([id number])    Delivering clinician: Lizy Cross MD  Attempted Delivery Types (Choose all that apply): Spontaneous Vaginal Delivery  Delivery  Type (Choose the 1 that will go to the Birth History): Vaginal, Spontaneous                   Other personnel:  Provider Role   Nancy De La Fuente, RN Delivery Nurse   Vira Bledsoe, RN Delivery Nurse   Renee Sánchez, RN Registered Nurse                Placenta    Removal: Spontaneous  Disposition: Hospital disposal           Anesthesia    Method: INTRAVENOUS                 Presentation and Position    Presentation: Vertex                  Lizy Cross MD

## 2022-09-10 VITALS
HEART RATE: 62 BPM | OXYGEN SATURATION: 98 % | RESPIRATION RATE: 18 BRPM | WEIGHT: 174 LBS | BODY MASS INDEX: 32.02 KG/M2 | SYSTOLIC BLOOD PRESSURE: 107 MMHG | DIASTOLIC BLOOD PRESSURE: 74 MMHG | TEMPERATURE: 97.7 F | HEIGHT: 62 IN

## 2022-09-10 PROCEDURE — 250N000013 HC RX MED GY IP 250 OP 250 PS 637: Performed by: OBSTETRICS & GYNECOLOGY

## 2022-09-10 RX ORDER — IBUPROFEN 800 MG/1
800 TABLET, FILM COATED ORAL
Status: ON HOLD | COMMUNITY
Start: 2022-09-10 | End: 2023-10-26

## 2022-09-10 RX ORDER — ACETAMINOPHEN 325 MG/1
650 TABLET ORAL EVERY 4 HOURS PRN
Status: ON HOLD | COMMUNITY
Start: 2022-09-10 | End: 2023-10-26

## 2022-09-10 RX ADMIN — ACETAMINOPHEN 650 MG: 325 TABLET, FILM COATED ORAL at 06:32

## 2022-09-10 RX ADMIN — IBUPROFEN 800 MG: 800 TABLET ORAL at 08:48

## 2022-09-10 ASSESSMENT — ACTIVITIES OF DAILY LIVING (ADL)
ADLS_ACUITY_SCORE: 20

## 2022-09-10 NOTE — LACTATION NOTE
Lactation consultant to patient room to assess breastfeeding (history, goals, & current experience). Mom states she  her first two children for 3 months until return to work. Has only formula fed this baby, but wanted to discuss how to get off to a good start.    Reviewed benefit of skin to skin prior to feeding to help get baby awake and ready for feeding, importance of feeding baby on early hunger cues, and breastfeeding ir (or pump) 8-12 times in 24 hours for adequate infant nutrition and hydration as well as for building an optimal milk supply. Education given/hands on help with infant positioning for deep, comfortable latch and effective milk transfer. Reviewed techniques for keeping infant actively sucking, including breast compressions.    Anticipatory guidance given on input/output goals, normal feeding amounts the first week, cluster feeding, engorgement, and pumping. Reviewed online and outpatient lactation resources for breastfeeding help after discharge.     Answered questions and gave encouragement.

## 2022-09-10 NOTE — PLAN OF CARE
patient has met postpartum recovery goals and meet criteria for discharge. Patient has been seen by provider and discharge orders received. Discharge instructions reviewed with patients and questions answered. Patient to discharge home ambulatory with spouse and baby.

## 2022-09-10 NOTE — PROGRESS NOTES
"Postpartum Day 1      Subjective:  The patient feels well. The patient has no emotional concerns. The patient is ambulating well. She is voiding and passing gas.The amount and color of the lochia is appropriate for the duration of recovery, patient denies clots. The baby is well.    Objective   The patient has a blood pressure which is within the normal range. Urinary output is adequate.     Exam:   /74   Pulse 62   Temp 97.7  F (36.5  C) (Oral)   Resp 18   Ht 1.575 m (5' 2\")   Wt 78.9 kg (174 lb)   SpO2 98%   Breastfeeding Unknown   BMI 31.83 kg/m    General: NAD  Abdomen: soft, NT  Fundus: firm, nontender  Ext: no pain     Impression:   Normal postpartum course. PPD 1    Plan:   Home today    Katya Loaiza M.D.    "

## 2022-09-10 NOTE — PLAN OF CARE
Problem: Adjustment to Role Transition (Postpartum Vaginal Delivery)  Goal: Successful Maternal Role Transition  Outcome: Ongoing, Progressing  Intervention: Support Maternal Role Transition  Recent Flowsheet Documentation  Taken 9/9/2022 1600 by Adriana Pedraza RN  Supportive Measures: active listening utilized  Parent/Child Attachment Promotion:   cue recognition promoted   positive reinforcement provided     Problem: Bleeding (Postpartum Vaginal Delivery)  Goal: Hemostasis  Outcome: Ongoing, Progressing     Problem: Infection (Postpartum Vaginal Delivery)  Goal: Absence of Infection Signs/Symptoms  Outcome: Ongoing, Progressing     Problem: Pain (Postpartum Vaginal Delivery)  Goal: Acceptable Pain Control  Outcome: Ongoing, Progressing     Problem: Urinary Retention (Postpartum Vaginal Delivery)  Goal: Effective Urinary Elimination  Outcome: Ongoing, Progressing  Intervention: Promote Effective Urinary Elimination  Recent Flowsheet Documentation  Taken 9/9/2022 1600 by Adriana Pedraza RN  Urinary Elimination Promotion: frequent voiding encouraged     VSS and afebrile, FFU/1 with light lochia. Ambulating and voiding independently. [place] and attentive toward baby. Looking forward to discharge in AM as long as 24 hour testing for baby comes back WDL.

## 2022-09-10 NOTE — DISCHARGE SUMMARY
Discharge Summary    Patient Name:  Shavonne Ramos  :      1990  MRN:      [id number]    Admission Date: 2022  Delivery Date: 2022  Gestational Age at Delivery: 38w4d  Discharge Date: 9/10/2022    Patient Active Problem List   Diagnosis     Pregnant     Encounter for triage in pregnant patient     Labor abnormality, antepartum       Conditions Complicating Pregnancy: None    Primary Procedure performed:     Other Procedures performed: None    Condition at discharge:  Stable    Discharge Plan:     Follow-up with Primary Obstetrician in 6 weeks  Instructions:   No heavy lifting for 6 weeks   Regular diet   Medications: PNV, Tylenol, IBU    Provider:  Katya Loaiza M.D.    Date:  9/10/2022  Time:  10:07 AM

## 2022-09-10 NOTE — PLAN OF CARE
Patient delivered 9/8 by vaginal delivery. Vital signs have been stable. Postpartum checks have been normal bleeding has been light and 1 under umbilicus. She is voiding without difficulty. Pain has been managed with Tylenol and has only taken one time since delivery. She is bonding well with her infant and father of the baby is supportive.

## 2022-10-02 ENCOUNTER — HEALTH MAINTENANCE LETTER (OUTPATIENT)
Age: 32
End: 2022-10-02

## 2022-11-09 ENCOUNTER — LAB REQUISITION (OUTPATIENT)
Dept: LAB | Facility: CLINIC | Age: 32
End: 2022-11-09

## 2022-11-09 DIAGNOSIS — Z12.4 ENCOUNTER FOR SCREENING FOR MALIGNANT NEOPLASM OF CERVIX: ICD-10-CM

## 2022-11-09 PROCEDURE — 87624 HPV HI-RISK TYP POOLED RSLT: CPT | Performed by: OBSTETRICS & GYNECOLOGY

## 2022-11-09 PROCEDURE — G0124 SCREEN C/V THIN LAYER BY MD: HCPCS | Performed by: PATHOLOGY

## 2022-11-09 PROCEDURE — G0145 SCR C/V CYTO,THINLAYER,RESCR: HCPCS | Performed by: OBSTETRICS & GYNECOLOGY

## 2022-11-15 LAB
BKR LAB AP GYN ADEQUACY: ABNORMAL
BKR LAB AP GYN INTERPRETATION: ABNORMAL
BKR LAB AP HPV REFLEX: ABNORMAL
BKR LAB AP LMP: ABNORMAL
BKR LAB AP PREVIOUS ABNL DX: ABNORMAL
BKR LAB AP PREVIOUS ABNORMAL: ABNORMAL
PATH REPORT.COMMENTS IMP SPEC: ABNORMAL
PATH REPORT.COMMENTS IMP SPEC: ABNORMAL
PATH REPORT.RELEVANT HX SPEC: ABNORMAL

## 2022-11-17 LAB
HUMAN PAPILLOMA VIRUS 16 DNA: NEGATIVE
HUMAN PAPILLOMA VIRUS 18 DNA: NEGATIVE
HUMAN PAPILLOMA VIRUS FINAL DIAGNOSIS: ABNORMAL
HUMAN PAPILLOMA VIRUS OTHER HR: POSITIVE

## 2023-04-21 ENCOUNTER — OFFICE VISIT (OUTPATIENT)
Dept: FAMILY MEDICINE | Facility: CLINIC | Age: 33
End: 2023-04-21
Payer: COMMERCIAL

## 2023-04-21 ENCOUNTER — HOSPITAL ENCOUNTER (OUTPATIENT)
Dept: ULTRASOUND IMAGING | Facility: CLINIC | Age: 33
Discharge: HOME | End: 2023-04-21
Attending: PHYSICIAN ASSISTANT | Admitting: PHYSICIAN ASSISTANT
Payer: COMMERCIAL

## 2023-04-21 VITALS
DIASTOLIC BLOOD PRESSURE: 77 MMHG | WEIGHT: 162 LBS | SYSTOLIC BLOOD PRESSURE: 112 MMHG | HEART RATE: 88 BPM | BODY MASS INDEX: 29.63 KG/M2 | OXYGEN SATURATION: 100 % | TEMPERATURE: 98.9 F | RESPIRATION RATE: 14 BRPM

## 2023-04-21 DIAGNOSIS — R10.2 PELVIC CRAMPING: ICD-10-CM

## 2023-04-21 DIAGNOSIS — O20.9 FIRST TRIMESTER BLEEDING: ICD-10-CM

## 2023-04-21 LAB — HCG SERPL-ACNC: ABNORMAL MLU/ML (ref 0–4)

## 2023-04-21 PROCEDURE — 99214 OFFICE O/P EST MOD 30 MIN: CPT | Performed by: PHYSICIAN ASSISTANT

## 2023-04-21 PROCEDURE — 76801 OB US < 14 WKS SINGLE FETUS: CPT

## 2023-04-21 PROCEDURE — 84702 CHORIONIC GONADOTROPIN TEST: CPT | Performed by: PHYSICIAN ASSISTANT

## 2023-04-21 PROCEDURE — 36415 COLL VENOUS BLD VENIPUNCTURE: CPT | Performed by: PHYSICIAN ASSISTANT

## 2023-04-21 NOTE — PATIENT INSTRUCTIONS
Pelvic rest as we discussed.      Follow up next week with your OB for recheck in clinic and an ultrasound as well.

## 2023-04-21 NOTE — PROGRESS NOTES
Assessment & Plan     Subchorionic hemorrhage of placenta in first trimester, single or unspecified fetus  Patient was seen for first trimester vaginal bleeding and cramping.  Ultrasound was obtained and revealed a large subchorionic hemorrhage with single intrauterine pregnancy and positive heartbeat 11 weeks gestation.  I did call patient's OB/GYN Dr. Townsend and discussed over the telephone.  She has recommended pelvic rest.  Relative rest at home and follow-up next week at her clinic with both an office visit and an ultrasound.  Patient and her  relay understanding.  Discussed frequency of subchorionic hemorrhages, etiology,   approximately 15% risk of miscarriage with the patient.  Discussed she likely will continue to pass some blood. discussed indications to return or contact her OB/GYN should she have any severe abdominal pain, fevers, or passes any tissue.    First trimester bleeding    - HCG quantitative pregnancy  - HCG quantitative pregnancy    Pelvic cramping      Dori Mendenhall PA-C  [facility]    Kelly Marvin is a 32 year old female who presents to clinic today for the following health issues:  Chief Complaint   Patient presents with     Vaginal Bleeding     STATES HAD POSITIVE PREG TEST 3/25/23 THIS MORNING HAD GUSH OF BLOOD POSSIBLE MISCARRIAGE     HPI patient is a pleasant 32-year-old female who presents to the clinic with concerns regarding miscarriage.  She is accompanied by her .    LLMP ? End of Feb,  first period since last pregnancy 7 months ago.    Bleeding started  This morning, with a gush of blood x 2 occurances  Has not passed tissue.    Some lower abdomen cramping into back but no severe abdomen pain.      No fevers.  No nausea, vomiting. She has not received any previous ob care for this visit.       Review of Systems  Constitutional, HEENT, cardiovascular, pulmonary, gi and gu systems are negative, except as otherwise  "noted.    Patient Active Problem List   Diagnosis     Pregnant     Encounter for triage in pregnant patient     Labor abnormality, antepartum     Current Outpatient Medications   Medication     acetaminophen (TYLENOL) 325 MG tablet     ibuprofen (ADVIL/MOTRIN) 800 MG tablet     prenatal vitamin iron-folic acid 27mg-0.8mg (PRENATAL S) 27 mg iron- 800 mcg Tab tablet     No current facility-administered medications for this visit.           Objective    /77   Pulse 88   Temp 98.9  F (37.2  C) (Oral)   Resp 14   Wt 73.5 kg (162 lb)   LMP 02/26/2023   SpO2 100%   BMI 29.63 kg/m    Physical Exam   Pt is in no acute distress and appears well  Lungs: CTA  Heart: RRR, no murmur, no thrills or heaves   Ext: no edema  Skin: no rashes    Abdomen: soft, nondistended. Mild TTP RLQ, suprapubic area.  Minimal LLQ discomfort. No rebound or peritoneal signs. No masses or hsm.     Results for orders placed or performed during the hospital encounter of 04/21/23   US OB < 14 Weeks Single     Status: None    Narrative    EXAM: US OB < 14 WEEKS SINGLE-TRANSABDOMINAL  LOCATION: [facility]  DATE/TIME: 4/21/2023 3:18 PM CDT    INDICATION: positive pregnancy test, LMP \"end of Feb\",   vaginal bleeding, pelvic cramping R greater than L .  COMPARISON: None.  TECHNIQUE: Transabdominal scans were performed. Endovaginal ultrasound was performed to better visualize the embryo.    FINDINGS:  UTERUS: Single intrauterine gestation sac. The gestational sac is disproportionately small relative to the size of the fetus due to mass effect by extensive diffuse circumferential subchorionic hemorrhage that measures up to 1.2 cm greatest radial   thickness.  CRL: Measures 4.0 cm, equals 11 weeks 0 days.  FETAL HEART RATE: 181 bpm.  AMNIOTIC FLUID: Normal.  PLACENTA: Right posterolateral with extensive diffuse circumferential subchorionic hemorrhage as described above.    RIGHT OVARY: Normal.  LEFT OVARY: Normal.      " Impression    IMPRESSION:   1.  Single living intrauterine gestation at 11 weeks 0 days, EDC of 11/10/2023.  2.  The gestational sac is disproportionately small relative to the size of the fetus due to mass effect by extensive diffuse circumferential subchorionic hemorrhage that measures up to 1.2 cm greatest radial thickness.  Discussed with MATEUSZ Philip 3:35 PM 04/21/2023   Results for orders placed or performed in visit on 04/21/23   HCG quantitative pregnancy     Status: Abnormal   Result Value Ref Range    hCG Quantitative 130,594 (H) 0 - 4 mlU/mL

## 2023-05-20 ENCOUNTER — HEALTH MAINTENANCE LETTER (OUTPATIENT)
Age: 33
End: 2023-05-20

## 2023-07-26 ENCOUNTER — LAB REQUISITION (OUTPATIENT)
Dept: LAB | Facility: CLINIC | Age: 33
End: 2023-07-26
Payer: COMMERCIAL

## 2023-07-26 DIAGNOSIS — Z34.82 ENCOUNTER FOR SUPERVISION OF OTHER NORMAL PREGNANCY, SECOND TRIMESTER: ICD-10-CM

## 2023-07-26 LAB
BASOPHILS # BLD AUTO: 0 10E3/UL (ref 0–0.2)
BASOPHILS NFR BLD AUTO: 0 %
EOSINOPHIL # BLD AUTO: 0.1 10E3/UL (ref 0–0.7)
EOSINOPHIL NFR BLD AUTO: 1 %
ERYTHROCYTE [DISTWIDTH] IN BLOOD BY AUTOMATED COUNT: 14.2 % (ref 10–15)
HCT VFR BLD AUTO: 33.1 % (ref 35–47)
HGB BLD-MCNC: 10.4 G/DL (ref 11.7–15.7)
IMM GRANULOCYTES # BLD: 0 10E3/UL
IMM GRANULOCYTES NFR BLD: 0 %
LYMPHOCYTES # BLD AUTO: 1.3 10E3/UL (ref 0.8–5.3)
LYMPHOCYTES NFR BLD AUTO: 18 %
MCH RBC QN AUTO: 25.2 PG (ref 26.5–33)
MCHC RBC AUTO-ENTMCNC: 31.4 G/DL (ref 31.5–36.5)
MCV RBC AUTO: 80 FL (ref 78–100)
MONOCYTES # BLD AUTO: 0.4 10E3/UL (ref 0–1.3)
MONOCYTES NFR BLD AUTO: 6 %
NEUTROPHILS # BLD AUTO: 5.2 10E3/UL (ref 1.6–8.3)
NEUTROPHILS NFR BLD AUTO: 75 %
NRBC # BLD AUTO: 0 10E3/UL
NRBC BLD AUTO-RTO: 0 /100
PLATELET # BLD AUTO: 226 10E3/UL (ref 150–450)
RBC # BLD AUTO: 4.12 10E6/UL (ref 3.8–5.2)
WBC # BLD AUTO: 7 10E3/UL (ref 4–11)

## 2023-07-26 PROCEDURE — 87086 URINE CULTURE/COLONY COUNT: CPT | Mod: ORL | Performed by: NURSE PRACTITIONER

## 2023-07-26 PROCEDURE — 86803 HEPATITIS C AB TEST: CPT | Mod: ORL | Performed by: NURSE PRACTITIONER

## 2023-07-26 PROCEDURE — 86747 PARVOVIRUS ANTIBODY: CPT | Mod: ORL | Performed by: NURSE PRACTITIONER

## 2023-07-26 PROCEDURE — 80081 OBSTETRIC PANEL INC HIV TSTG: CPT | Mod: ORL | Performed by: NURSE PRACTITIONER

## 2023-07-27 LAB
ABO/RH(D): NORMAL
ANTIBODY SCREEN: NEGATIVE
HBV SURFACE AG SERPL QL IA: NONREACTIVE
HCV AB SERPL QL IA: NONREACTIVE
HIV 1+2 AB+HIV1 P24 AG SERPL QL IA: NONREACTIVE
RPR SER QL: NONREACTIVE
RUBV IGG SERPL QL IA: 0.85 INDEX
RUBV IGG SERPL QL IA: NORMAL
SPECIMEN EXPIRATION DATE: NORMAL

## 2023-07-28 LAB
B19V IGG SER IA-ACNC: 5.38 IV
B19V IGM SER IA-ACNC: 0.25 IV
BACTERIA UR CULT: NORMAL

## 2023-08-21 ENCOUNTER — LAB REQUISITION (OUTPATIENT)
Dept: LAB | Facility: CLINIC | Age: 33
End: 2023-08-21
Payer: COMMERCIAL

## 2023-08-21 DIAGNOSIS — O99.019 ANEMIA COMPLICATING PREGNANCY, UNSPECIFIED TRIMESTER: ICD-10-CM

## 2023-10-17 ENCOUNTER — LAB REQUISITION (OUTPATIENT)
Dept: LAB | Facility: CLINIC | Age: 33
End: 2023-10-17
Payer: COMMERCIAL

## 2023-10-17 DIAGNOSIS — Z36.85 ENCOUNTER FOR ANTENATAL SCREENING FOR STREPTOCOCCUS B: ICD-10-CM

## 2023-10-17 PROCEDURE — 87653 STREP B DNA AMP PROBE: CPT | Mod: ORL | Performed by: OBSTETRICS & GYNECOLOGY

## 2023-10-18 LAB — GP B STREP DNA SPEC QL NAA+PROBE: NEGATIVE

## 2023-10-25 ENCOUNTER — HOSPITAL ENCOUNTER (INPATIENT)
Facility: CLINIC | Age: 33
LOS: 1 days | Discharge: HOME HEALTH | End: 2023-10-26
Attending: OBSTETRICS & GYNECOLOGY | Admitting: OBSTETRICS & GYNECOLOGY
Payer: COMMERCIAL

## 2023-10-25 PROBLEM — Z34.90 PREGNANCY: Status: ACTIVE | Noted: 2023-10-25

## 2023-10-25 LAB
ABO/RH(D): NORMAL
ANTIBODY SCREEN: NEGATIVE
HGB BLD-MCNC: 9.4 G/DL (ref 11.7–15.7)
SPECIMEN EXPIRATION DATE: NORMAL
T PALLIDUM AB SER QL: NONREACTIVE

## 2023-10-25 PROCEDURE — 250N000013 HC RX MED GY IP 250 OP 250 PS 637: Performed by: OBSTETRICS & GYNECOLOGY

## 2023-10-25 PROCEDURE — 250N000011 HC RX IP 250 OP 636: Mod: JZ

## 2023-10-25 PROCEDURE — 120N000001 HC R&B MED SURG/OB

## 2023-10-25 PROCEDURE — 250N000013 HC RX MED GY IP 250 OP 250 PS 637

## 2023-10-25 PROCEDURE — 0UCC7ZZ EXTIRPATION OF MATTER FROM CERVIX, VIA NATURAL OR ARTIFICIAL OPENING: ICD-10-PCS | Performed by: OBSTETRICS & GYNECOLOGY

## 2023-10-25 PROCEDURE — 250N000009 HC RX 250

## 2023-10-25 PROCEDURE — 258N000003 HC RX IP 258 OP 636

## 2023-10-25 PROCEDURE — 722N000001 HC LABOR CARE VAGINAL DELIVERY SINGLE

## 2023-10-25 PROCEDURE — 86780 TREPONEMA PALLIDUM: CPT

## 2023-10-25 PROCEDURE — 36415 COLL VENOUS BLD VENIPUNCTURE: CPT

## 2023-10-25 PROCEDURE — 10D17Z9 MANUAL EXTRACTION OF PRODUCTS OF CONCEPTION, RETAINED, VIA NATURAL OR ARTIFICIAL OPENING: ICD-10-PCS | Performed by: OBSTETRICS & GYNECOLOGY

## 2023-10-25 PROCEDURE — 85018 HEMOGLOBIN: CPT

## 2023-10-25 PROCEDURE — 86901 BLOOD TYPING SEROLOGIC RH(D): CPT

## 2023-10-25 RX ORDER — OXYTOCIN 10 [USP'U]/ML
10 INJECTION, SOLUTION INTRAMUSCULAR; INTRAVENOUS
Status: DISCONTINUED | OUTPATIENT
Start: 2023-10-25 | End: 2023-10-25 | Stop reason: HOSPADM

## 2023-10-25 RX ORDER — METOCLOPRAMIDE HYDROCHLORIDE 5 MG/ML
10 INJECTION INTRAMUSCULAR; INTRAVENOUS EVERY 6 HOURS PRN
Status: DISCONTINUED | OUTPATIENT
Start: 2023-10-25 | End: 2023-10-25 | Stop reason: HOSPADM

## 2023-10-25 RX ORDER — CITRIC ACID/SODIUM CITRATE 334-500MG
30 SOLUTION, ORAL ORAL
Status: DISCONTINUED | OUTPATIENT
Start: 2023-10-25 | End: 2023-10-25 | Stop reason: HOSPADM

## 2023-10-25 RX ORDER — MISOPROSTOL 200 UG/1
400 TABLET ORAL
Status: DISCONTINUED | OUTPATIENT
Start: 2023-10-25 | End: 2023-10-26 | Stop reason: HOSPADM

## 2023-10-25 RX ORDER — MODIFIED LANOLIN
OINTMENT (GRAM) TOPICAL
Status: DISCONTINUED | OUTPATIENT
Start: 2023-10-25 | End: 2023-10-26 | Stop reason: HOSPADM

## 2023-10-25 RX ORDER — CARBOPROST TROMETHAMINE 250 UG/ML
250 INJECTION, SOLUTION INTRAMUSCULAR
Status: DISCONTINUED | OUTPATIENT
Start: 2023-10-25 | End: 2023-10-26 | Stop reason: HOSPADM

## 2023-10-25 RX ORDER — METHYLERGONOVINE MALEATE 0.2 MG/ML
200 INJECTION INTRAVENOUS
Status: DISCONTINUED | OUTPATIENT
Start: 2023-10-25 | End: 2023-10-25 | Stop reason: HOSPADM

## 2023-10-25 RX ORDER — PROCHLORPERAZINE MALEATE 10 MG
10 TABLET ORAL EVERY 6 HOURS PRN
Status: DISCONTINUED | OUTPATIENT
Start: 2023-10-25 | End: 2023-10-25 | Stop reason: HOSPADM

## 2023-10-25 RX ORDER — OXYTOCIN/0.9 % SODIUM CHLORIDE 30/500 ML
100-340 PLASTIC BAG, INJECTION (ML) INTRAVENOUS CONTINUOUS PRN
Status: DISCONTINUED | OUTPATIENT
Start: 2023-10-25 | End: 2023-10-26 | Stop reason: HOSPADM

## 2023-10-25 RX ORDER — CARBOPROST TROMETHAMINE 250 UG/ML
250 INJECTION, SOLUTION INTRAMUSCULAR
Status: DISCONTINUED | OUTPATIENT
Start: 2023-10-25 | End: 2023-10-25 | Stop reason: HOSPADM

## 2023-10-25 RX ORDER — NALOXONE HYDROCHLORIDE 0.4 MG/ML
0.4 INJECTION, SOLUTION INTRAMUSCULAR; INTRAVENOUS; SUBCUTANEOUS
Status: DISCONTINUED | OUTPATIENT
Start: 2023-10-25 | End: 2023-10-25 | Stop reason: HOSPADM

## 2023-10-25 RX ORDER — OXYTOCIN/0.9 % SODIUM CHLORIDE 30/500 ML
340 PLASTIC BAG, INJECTION (ML) INTRAVENOUS CONTINUOUS PRN
Status: COMPLETED | OUTPATIENT
Start: 2023-10-25 | End: 2023-10-25

## 2023-10-25 RX ORDER — METHYLERGONOVINE MALEATE 0.2 MG/ML
200 INJECTION INTRAVENOUS
Status: DISCONTINUED | OUTPATIENT
Start: 2023-10-25 | End: 2023-10-26 | Stop reason: HOSPADM

## 2023-10-25 RX ORDER — MISOPROSTOL 200 UG/1
400 TABLET ORAL
Status: DISCONTINUED | OUTPATIENT
Start: 2023-10-25 | End: 2023-10-25 | Stop reason: HOSPADM

## 2023-10-25 RX ORDER — NALOXONE HYDROCHLORIDE 0.4 MG/ML
0.2 INJECTION, SOLUTION INTRAMUSCULAR; INTRAVENOUS; SUBCUTANEOUS
Status: DISCONTINUED | OUTPATIENT
Start: 2023-10-25 | End: 2023-10-25 | Stop reason: HOSPADM

## 2023-10-25 RX ORDER — BISACODYL 10 MG
10 SUPPOSITORY, RECTAL RECTAL DAILY PRN
Status: DISCONTINUED | OUTPATIENT
Start: 2023-10-25 | End: 2023-10-26 | Stop reason: HOSPADM

## 2023-10-25 RX ORDER — OXYTOCIN 10 [USP'U]/ML
10 INJECTION, SOLUTION INTRAMUSCULAR; INTRAVENOUS
Status: DISCONTINUED | OUTPATIENT
Start: 2023-10-25 | End: 2023-10-26 | Stop reason: HOSPADM

## 2023-10-25 RX ORDER — IBUPROFEN 800 MG/1
800 TABLET, FILM COATED ORAL EVERY 6 HOURS PRN
Status: DISCONTINUED | OUTPATIENT
Start: 2023-10-25 | End: 2023-10-26 | Stop reason: HOSPADM

## 2023-10-25 RX ORDER — ONDANSETRON 4 MG/1
4 TABLET, ORALLY DISINTEGRATING ORAL EVERY 6 HOURS PRN
Status: DISCONTINUED | OUTPATIENT
Start: 2023-10-25 | End: 2023-10-25 | Stop reason: HOSPADM

## 2023-10-25 RX ORDER — IBUPROFEN 800 MG/1
800 TABLET, FILM COATED ORAL
Status: DISCONTINUED | OUTPATIENT
Start: 2023-10-25 | End: 2023-10-26 | Stop reason: HOSPADM

## 2023-10-25 RX ORDER — DOCUSATE SODIUM 100 MG/1
100 CAPSULE, LIQUID FILLED ORAL DAILY
Status: DISCONTINUED | OUTPATIENT
Start: 2023-10-25 | End: 2023-10-26 | Stop reason: HOSPADM

## 2023-10-25 RX ORDER — MISOPROSTOL 200 UG/1
800 TABLET ORAL
Status: DISCONTINUED | OUTPATIENT
Start: 2023-10-25 | End: 2023-10-26 | Stop reason: HOSPADM

## 2023-10-25 RX ORDER — HYDROCORTISONE 25 MG/G
CREAM TOPICAL 3 TIMES DAILY PRN
Status: DISCONTINUED | OUTPATIENT
Start: 2023-10-25 | End: 2023-10-26 | Stop reason: HOSPADM

## 2023-10-25 RX ORDER — MISOPROSTOL 200 UG/1
800 TABLET ORAL
Status: DISCONTINUED | OUTPATIENT
Start: 2023-10-25 | End: 2023-10-25 | Stop reason: HOSPADM

## 2023-10-25 RX ORDER — OXYTOCIN/0.9 % SODIUM CHLORIDE 30/500 ML
340 PLASTIC BAG, INJECTION (ML) INTRAVENOUS CONTINUOUS PRN
Status: DISCONTINUED | OUTPATIENT
Start: 2023-10-25 | End: 2023-10-26 | Stop reason: HOSPADM

## 2023-10-25 RX ORDER — ONDANSETRON 2 MG/ML
4 INJECTION INTRAMUSCULAR; INTRAVENOUS EVERY 6 HOURS PRN
Status: DISCONTINUED | OUTPATIENT
Start: 2023-10-25 | End: 2023-10-25 | Stop reason: HOSPADM

## 2023-10-25 RX ORDER — KETOROLAC TROMETHAMINE 30 MG/ML
30 INJECTION, SOLUTION INTRAMUSCULAR; INTRAVENOUS
Status: DISCONTINUED | OUTPATIENT
Start: 2023-10-25 | End: 2023-10-26 | Stop reason: HOSPADM

## 2023-10-25 RX ORDER — PROCHLORPERAZINE 25 MG
25 SUPPOSITORY, RECTAL RECTAL EVERY 12 HOURS PRN
Status: DISCONTINUED | OUTPATIENT
Start: 2023-10-25 | End: 2023-10-25 | Stop reason: HOSPADM

## 2023-10-25 RX ORDER — METOCLOPRAMIDE 10 MG/1
10 TABLET ORAL EVERY 6 HOURS PRN
Status: DISCONTINUED | OUTPATIENT
Start: 2023-10-25 | End: 2023-10-25 | Stop reason: HOSPADM

## 2023-10-25 RX ORDER — ACETAMINOPHEN 325 MG/1
650 TABLET ORAL EVERY 4 HOURS PRN
Status: DISCONTINUED | OUTPATIENT
Start: 2023-10-25 | End: 2023-10-26 | Stop reason: HOSPADM

## 2023-10-25 RX ADMIN — TRANEXAMIC ACID 1 G: 1 INJECTION, SOLUTION INTRAVENOUS at 11:06

## 2023-10-25 RX ADMIN — METHYLERGONOVINE MALEATE 200 MCG: 0.2 INJECTION, SOLUTION INTRAMUSCULAR; INTRAVENOUS at 11:08

## 2023-10-25 RX ADMIN — ACETAMINOPHEN 650 MG: 325 TABLET ORAL at 16:55

## 2023-10-25 RX ADMIN — MISOPROSTOL 800 MCG: 200 TABLET ORAL at 11:02

## 2023-10-25 RX ADMIN — IBUPROFEN 800 MG: 800 TABLET ORAL at 18:10

## 2023-10-25 RX ADMIN — Medication 340 ML/HR: at 10:57

## 2023-10-25 RX ADMIN — SODIUM CHLORIDE, POTASSIUM CHLORIDE, SODIUM LACTATE AND CALCIUM CHLORIDE 1000 ML: 600; 310; 30; 20 INJECTION, SOLUTION INTRAVENOUS at 09:57

## 2023-10-25 RX ADMIN — KETOROLAC TROMETHAMINE 30 MG: 30 INJECTION, SOLUTION INTRAMUSCULAR; INTRAVENOUS at 11:37

## 2023-10-25 RX ADMIN — DOCUSATE SODIUM 100 MG: 100 CAPSULE, LIQUID FILLED ORAL at 16:55

## 2023-10-25 ASSESSMENT — ACTIVITIES OF DAILY LIVING (ADL)
ADLS_ACUITY_SCORE: 35
ADLS_ACUITY_SCORE: 20

## 2023-10-25 NOTE — PROGRESS NOTES
Pt delivered baby boy vaginally. Vaginal bleeding after delivery. Methergin, TXA, cytotec and pitocin given post delivery. Pt bleeding is diminished. Fundus firm at the umbilicus. Pt report no pain at this time with tylenol and Ibuprofen. Pt up independently in room. + void, + oral intake. Pt bonding well with baby. Pt is breast feeding. Latch appears good. Did place on lactation list to see pt. For flanges on pump and breast milk supply questions. FOB at bedside and helpful for both patient and baby.

## 2023-10-25 NOTE — H&P
OBSTETRICS ADMISSION HISTORY & PHYSICAL  DATE OF ADMISSION: 10/25/2023  8:38 AM        Assessment and Plan:   Assessment:  Karthik Gandara is a 33 year old  at 37w5d in active labor. Membranes are ruptured. GBS status is negative.   Patient Active Problem List   Diagnosis    Pregnant    Encounter for triage in pregnant patient    Labor abnormality, antepartum    Pregnancy        PLAN:   Admit - see IP orders  GBS: negative. Antibiotics are not indicated   Comfort plan: can have epidural if desired  Will monitor labor progress along with RN and update attending physician  5.   Notified on-call OB Dr. Townsend    Patient discussed with attending physician, Dr. Townsend  , who agrees with the plan.     DAXA SCHULZ MD PGY-1,  10/25/2023  [facility]         Chief Complaint:     SROM @0700       History of Present Illness:     Karthik Gandara is a 33 year old year old  at 37w5d. Patient received prenatal care with Dr. Townsend at [facility].      [facility] for active labor management and SROM.    They report regular contractions starting soon after SROM at 0700. Fetal movement is normal.    Their prenatal course has been uncomplicated.    Prenatal labs   Lab Results   Component Value Date    AS Negative 2023    HEPBANG Nonreactive 2023    HGB 10.4 (L) 2023       GBS was collected on 10/17/23.  Weight gain during pregnancy: Not found.         Obstetrical History:     OB History    Para Term  AB Living   5 3 3 0 1 3   SAB IAB Ectopic Multiple Live Births   1 0 0 0 3      # Outcome Date GA Lbr Fuentes/2nd Weight Sex Delivery Anes PTL Lv   5 Current            4 Term 22 38w4d 01:00 3.44 kg (7 lb 9.3 oz) M Vag-Spont IV N GOVIND      Name: JULIOCESARMALE-KARTHIK      Apgar1: 9  Apgar5: 9   3 Term 19 37w5d 04:50 / 00:12 3.062 kg (6 lb 12 oz) M Vag-Spont IV N GOVIND      Name: ALDEN GANDARA-KARTHIK      Apgar1: 8  Apgar5: 9   2  Term 12/31/16 39w1d 01:40 / 02:12 3.02 kg (6 lb 10.5 oz) F Vag-Spont None N GOVIND      Name: CHERISE GANDARA      Apgar1: 9  Apgar5: 9   1 SAB                       Immunzations:     Most Recent Immunizations   Administered Date(s) Administered    Flu, Unspecified 10/28/2016    HPV Quadrivalent 09/14/2012    Influenza (IIV3) PF 10/16/2015    Influenza Intranasal Vaccine 10/25/2013    Influenza Vaccine >6 months (Alfuria,Fluzone) 10/31/2017    Influenza, Whole Virus 09/14/2012    MMR 01/01/2017    TDAP (Adacel,Boostrix) 06/10/2019     Tdap this pregnancy?  YES - Date: 8/21/23  Flu shot this pregnancy? NO  COVID vaccine? NO         Past Medical History:   No past medical history on file.         Past Surgical History:     Past Surgical History:   Procedure Laterality Date    OTHER SURGICAL HISTORY  2012    donated kidney            Family History:   No family history on file.         Social History:   no tobacco use  no alcohol use  no illicit drug use         Medications:   No current facility-administered medications on file prior to encounter.  acetaminophen (TYLENOL) 325 MG tablet, Take 2 tablets (650 mg) by mouth every 4 hours as needed for mild pain or fever (greater than or equal to 38  C /100.4  F (oral) or 38.5  C/ 101.4  F (core).) (Patient not taking: Reported on 4/21/2023)  ibuprofen (ADVIL/MOTRIN) 800 MG tablet, Take 1 tablet (800 mg) by mouth once as needed for moderate pain (For mild to moderate pain.) (Patient not taking: Reported on 4/21/2023)  prenatal vitamin iron-folic acid 27mg-0.8mg (PRENATAL S) 27 mg iron- 800 mcg Tab tablet, Take 1 tablet by mouth daily (Patient not taking: Reported on 4/21/2023)             Allergies:   Patient has no known allergies.         Review of Systems:   CONSTITUTIONAL: no fatigue, no unexpected change in weight  SKIN: no worrisome rashes or lesions  EYES: no acute vision problems or changes  ENT: no ear problems, no mouth problems, no throat problems  RESP: no  "significant cough, no shortness of breath  CV: no chest pain, no palpitations, no new or worsening peripheral edema  GI: no nausea, no vomiting, no constipation, no diarrhea  : no frequency, no dysuria, no hematuria  NEURO: no weakness, no dizziness, no headaches  ENDOCRINE: no temperature intolerance, no skin/hair changes  PSYCHIATRIC: NEGATIVE for changes in mood or trouble with sleep         Physical Exam:   Vitals:   Ht 1.575 m (5' 2\")   Wt 77.1 kg (170 lb)   LMP 02/26/2023   BMI 31.09 kg/m    170 lbs 0 oz  Estimated body mass index is 31.09 kg/m  as calculated from the following:    Height as of this encounter: 1.575 m (5' 2\").    Weight as of this encounter: 77.1 kg (170 lb).    GEN: Awake, alert in no apparent distress   HEENT: grossly normal  RESPIRATORY: no increased work of breathing  CARDIOVASCULAR: RRR, no murmur  ABDOMEN: gravid  PELVIC:  SROM  Cervix: 7/80-90%/-1  EXT:  no edema or calf tenderness    Electronic Fetal Monitoring:  Baseline rate normal  Variability moderate  Accelerations present  Decelerations not present    Assessment: Category I EFM interpretation suggests absence of concern for fetal metabolic acidemia at this time due to accelerations present, decelerations absent, heart rate: normal baseline, and variability: moderate    Uterine Activity normal.    Strip reviewed on unit    NST interpretation:  Baseline rate 130 normal  Accelerations present  Decelerations not present  Interpretation: reactive    "

## 2023-10-25 NOTE — L&D DELIVERY NOTE
VAGINAL DELIVERY NOTE    PRE DELIVERY DIAGNOSIS  1) Intrauterine pregnancy at 37w5d Gestational age  2) SROM    POST DELIVERY DIAGNOSIS  1) Intrauterine pregnancy at 37w5d Gestational age  2) Delivery of a male living infant.  3) Apgars of 8 at one minute, 8 at 5 minutes  4) weight unavailable at this time    PROBLEM LIST:  Patient Active Problem List   Diagnosis    Pregnant    Encounter for triage in pregnant patient    Labor abnormality, antepartum    Pregnancy       LABS:  @LABRSLTOB(ABORH EXT,LN-ABORH,HML ABO/RH,HGB EXT,HGB,RUBELLA EXT,LN-RUBELLA IGG ANTIBODY:Last:1)@     Delivery Method/Type:       PROVIDER:   Andie Townsend DO     ASSISTANTS  Dr. Gabi Matthews MD    EPISIOTOMY or INCISION:  none    INDICATION FOR INSTRUMENTAL DELIVERY   none    PLACENTA AND CORD:  By manual extraction intact,  with a 3 vessel cord. Avulsed cord      QUANTITATIVE BLOOD LOSS:  Delivery QBL (mL): 780        COMPLICATIONS:  none    DESCRIPTION OF PROCEDURE  Shavonne Ramos is a 33 year old  who was admitted at 37w5d. She became complete with reduction of the cervix at 8cm and started pushing. The baby's head was then delivered. There was a spontaneous cry. The remainder of the infant easily delivered. The cord was clamped x 2 and cut and the infant handed off to waiting nursing personnel. No gross fetal defects were observed. The cord avulsed and the placenta was removed manually from the vagina. There was a 3 vessel cord.  There was some brisk vaginal bleeding.  A clot was removed manually from the cervix.  The patient received: methergine, AR cytotec, TXA, and pitocin.  Bleeding subsided. There were no lacerations . The baby stayed in the room with mother. The mother remained in labor and delivery. Sponge and sharp count is correct.    Andie Townsend DO on 10/25/2023 at 11:18 AM       CC1:

## 2023-10-26 VITALS
TEMPERATURE: 98.5 F | WEIGHT: 156.31 LBS | HEART RATE: 79 BPM | DIASTOLIC BLOOD PRESSURE: 61 MMHG | OXYGEN SATURATION: 97 % | SYSTOLIC BLOOD PRESSURE: 97 MMHG | RESPIRATION RATE: 16 BRPM | HEIGHT: 62 IN | BODY MASS INDEX: 28.76 KG/M2

## 2023-10-26 LAB — HGB BLD-MCNC: 8.2 G/DL (ref 11.7–15.7)

## 2023-10-26 PROCEDURE — 250N000013 HC RX MED GY IP 250 OP 250 PS 637: Performed by: OBSTETRICS & GYNECOLOGY

## 2023-10-26 PROCEDURE — 85018 HEMOGLOBIN: CPT | Performed by: OBSTETRICS & GYNECOLOGY

## 2023-10-26 PROCEDURE — 36415 COLL VENOUS BLD VENIPUNCTURE: CPT | Performed by: OBSTETRICS & GYNECOLOGY

## 2023-10-26 RX ORDER — IBUPROFEN 800 MG/1
800 TABLET, FILM COATED ORAL
Qty: 20 TABLET | Refills: 0 | Status: SHIPPED | OUTPATIENT
Start: 2023-10-26

## 2023-10-26 RX ADMIN — WITCH HAZEL: 500 SOLUTION RECTAL; TOPICAL at 11:13

## 2023-10-26 RX ADMIN — BENZOCAINE AND LEVOMENTHOL: 200; 5 SPRAY TOPICAL at 11:13

## 2023-10-26 RX ADMIN — DOCUSATE SODIUM 100 MG: 100 CAPSULE, LIQUID FILLED ORAL at 11:05

## 2023-10-26 RX ADMIN — ACETAMINOPHEN 650 MG: 325 TABLET ORAL at 11:05

## 2023-10-26 ASSESSMENT — ACTIVITIES OF DAILY LIVING (ADL)
ADLS_ACUITY_SCORE: 20

## 2023-10-26 NOTE — DISCHARGE INSTRUCTIONS
Postpartum Vaginal Delivery Instructions    Activity     Ask family and friends for help when you need it.  Do not place anything in your vagina for 6 weeks.  You are not restricted on other activities, but take it easy for a few weeks to allow your body to recover from delivery.  You are able to do any activities you feel up to that point.  No driving until you have stopped taking your pain medications (usually two weeks after delivery).     Call your health care provider if you have any of these symptoms:     Increased pain, swelling, redness, or fluid around your stiches from an episiotomy or perineal tear.  A fever above 100.4 F (38 C) with or without chills when placing a thermometer under your tongue.  You soak a sanitary pad with blood within 1 hour, or you see blood clots larger than a golf ball.  Bleeding that lasts more than 6 weeks.  Vaginal discharge that smells bad.  Severe pain, cramping or tenderness in your lower belly area.  A need to urinate more frequently (use the toilet more often), more urgently (use the toilet very quickly), or it burns when you urinate.  Nausea and vomiting.  Redness, swelling or pain around a vein in your leg.  Problems breastfeeding or a red or painful area on your breast.  Chest pain and cough or are gasping for air.  Problems coping with sadness, anxiety, or depression.  If you have any concerns about hurting yourself or the baby, call your provider immediately.   You have questions or concerns after you return home.     Keep your hands clean:  Always wash your hands before touching your perineal area and stitches.  This helps reduce your risk of infection.  If your hands aren't dirty, you may use an alcohol hand-rub to clean your hands. Keep your nails clean and short.      A Homecare Visit is set up on Fri, Oct 27th.The RN will call you after 4 p.m. the evening before the visit with a time. Please do not make a clinic visit for the same day as your Homecare Visit. You  can contact [facility] at [phone] if you have any further questions related to the home visit.     Warning Signs after Having a Baby    Keep this paper on your fridge or somewhere else where you can see it.    Call your provider if you have any of these symptoms up to 12 weeks after having your baby.    Thoughts of hurting yourself or your baby  Pain in your chest or trouble breathing  Severe headache not helped by pain medicine  Eyesight concerns (blurry vision, seeing spots or flashes of light, other changes to eyesight)  Fainting, shaking or other signs of a seizure    Call 9-1-1 if you feel that it is an emergency.     The symptoms below can happen to anyone after giving birth. They can be very serious. Call your provider if you have any of these warning signs.    My provider s phone number: _______________________    Losing too much blood (hemorrhage)    Call your provider if you soak through a pad in less than an hour or pass blood clots bigger than a golf ball. These may be signs that you are bleeding too much.    Blood clots in the legs or lungs    After you give birth, your body naturally clots its blood to help prevent blood loss. Sometimes this increased clotting can happen in other areas of the body, like the legs or lungs. This can block your blood flow and be very dangerous.     Call your provider if you:  Have a red, swollen spot on the back of your leg that is warm or painful when you touch it.   Are coughing up blood.     Infection    Call your provider if you have any of these symptoms:  Fever of 100.4 F (38 C) or higher.  Pain or redness around your stitches if you had an incision.   Any yellow, white, or green fluid coming from places where you had stitches or surgery.    Mood Problems (postpartum depression)    Many people feel sad or have mood changes after having a baby. But for some people, these mood swings are worse.     Call your provider right away if you feel so anxious or  nervous that you can't care for yourself or your baby.    Preeclampsia (high blood pressure)    Even if you didn't have high blood pressure when you were pregnant, you are at risk for the high blood pressure disease called preeclampsia. This risk can last up to 12 weeks after giving birth.     Call your provider if you have:   Pain on your right side under your rib cage  Sudden swelling in the hands and face    Remember: You know your body. If something doesn't feel right, get medical help.     For informational purposes only. Not to replace the advice of your health care provider. Copyright 2020 [employer]. All rights reserved. Clinically reviewed by Irina Fabian, RNC-OB, MSN. [email or web address] [id number] - Rev 02/23.

## 2023-10-26 NOTE — PROGRESS NOTES
"Outreach Note for EPIC    Chart reviewed, discharge plan discussed with patient, needs assessed. Patient verbalizes understanding of plan, requests [facility] as ordered, [facility] nurse visit planned for Fri, Oct 27th, Home Care Intake updated. Patient and , \"Stevenson Solorzano\", phone number is reported as correct in EMR.    Patient states she has good support at home, has baby care essentials, and feels ready to discharge today. Outreach RN will continue to follow and assist if needed with discharge plan. No additional needs identified at this time.      "

## 2023-10-26 NOTE — LACTATION NOTE
This note was copied from a baby's chart.  Referred to Shavonne to assist with a feeding. Stevenson is her fourth baby. She reported that breast feeding the first two was challenging and better with the 3rd baby.She has a Spectra at home from her last child. To ask for the Rx for this pregnancy in case her used Spectra is broken.    Shavonne mentioned that her L nipple was very sore. With a gloved finger, a suck assessment was completed. It was noted that Stevenson has a tighter frenum. When he sucked on LC finger, he tended to loose contact frequently.    With pillows in place, a feeding was initiated on the R breast in a cross cradle hold. Shavonne was reminded of  posturing and holds. Expression of colostrum was done on nipple and Shavonne shown how to tip the nipple to the roof of Kalli mouth. While nursing, Stevenson quickly slid to the tip of the nipple. This was painful for Shavonne. A new latch was obtained but Kalli tongue was not visible at the edge of the gumline as expected.Also, his upper lip was not flanging outward as expected. The feeding was continued on the L breast and Shavonne mentioned the tenderness of this nipple again. She was reminded of the importance of rotating positions at the breast.    Shavonne was sized for breast flanges and she needs 20-21mm.  offered 21mm flanges for use with the Symphony pump while inpt. Also, Shavonne is a [employer] employee and can use the Symphony pieces when pumping at work.    Offering donor milk or formula after nursing was discussed. To have Stevenson reweighed with home visit tomorrow. Outpt lactation services were reviewed including ECFE and resources for further assessment of tongue and lip, as needed.

## 2023-10-26 NOTE — PLAN OF CARE
Problem: Adult Inpatient Plan of Care  Goal: Plan of Care Review  Description: The Plan of Care Review/Shift note should be completed every shift.  The Outcome Evaluation is a brief statement about your assessment that the patient is improving, declining, or no change.  This information will be displayed automatically on your shift  note.  Outcome: Adequate for Care Transition  Flowsheets (Taken 10/26/2023 8009)  Plan of Care Reviewed With: patient  Overall Patient Progress: improving   Goal Outcome Evaluation:      Plan of Care Reviewed With: patient    Overall Patient Progress: improvingOverall Patient Progress: improving     Pt's VS are WDL. Up ad emperatriz. Voiding, due to have bowel movement. Uterus firm, midline, and 1 cm below umbilicus. Managing pain with tylenol, ibuprofen, and dermoplast. Pt meets discharge instructions. All discharge education provided. Any questions answered. No further concerns as of present.

## 2023-12-06 ENCOUNTER — LAB REQUISITION (OUTPATIENT)
Dept: LAB | Facility: CLINIC | Age: 33
End: 2023-12-06
Payer: COMMERCIAL

## 2023-12-06 DIAGNOSIS — Z12.4 ENCOUNTER FOR SCREENING FOR MALIGNANT NEOPLASM OF CERVIX: ICD-10-CM

## 2023-12-06 PROCEDURE — 87624 HPV HI-RISK TYP POOLED RSLT: CPT | Mod: ORL | Performed by: NURSE PRACTITIONER

## 2023-12-06 PROCEDURE — G0145 SCR C/V CYTO,THINLAYER,RESCR: HCPCS | Mod: ORL | Performed by: NURSE PRACTITIONER

## 2023-12-11 PROCEDURE — 88141 CYTOPATH C/V INTERPRET: CPT | Performed by: PATHOLOGY

## 2024-03-06 ENCOUNTER — MEDICAL CORRESPONDENCE (OUTPATIENT)
Dept: HEALTH INFORMATION MANAGEMENT | Facility: CLINIC | Age: 34
End: 2024-03-06
Payer: COMMERCIAL

## 2024-07-27 ENCOUNTER — HEALTH MAINTENANCE LETTER (OUTPATIENT)
Age: 34
End: 2024-07-27

## 2024-11-27 ENCOUNTER — IMMUNIZATION (OUTPATIENT)
Dept: FAMILY MEDICINE | Facility: CLINIC | Age: 34
End: 2024-11-27
Payer: COMMERCIAL

## 2024-11-27 PROCEDURE — 90656 IIV3 VACC NO PRSV 0.5 ML IM: CPT

## 2024-11-27 PROCEDURE — 90471 IMMUNIZATION ADMIN: CPT

## 2025-08-10 ENCOUNTER — HEALTH MAINTENANCE LETTER (OUTPATIENT)
Age: 35
End: 2025-08-10